# Patient Record
Sex: MALE | Race: WHITE | URBAN - METROPOLITAN AREA
[De-identification: names, ages, dates, MRNs, and addresses within clinical notes are randomized per-mention and may not be internally consistent; named-entity substitution may affect disease eponyms.]

---

## 2022-10-17 ENCOUNTER — OFFICE VISIT (OUTPATIENT)
Dept: URBAN - METROPOLITAN AREA CLINIC 71 | Facility: CLINIC | Age: 32
End: 2022-10-17
Payer: COMMERCIAL

## 2022-10-17 DIAGNOSIS — H00.029 HORDEOLUM INTERNUM OF EYELID: Primary | ICD-10-CM

## 2022-10-17 PROCEDURE — 92002 INTRM OPH EXAM NEW PATIENT: CPT | Performed by: OPTOMETRIST

## 2022-10-17 RX ORDER — NEOMYCIN SULFATE, POLYMYXIN B SULFATE AND DEXAMETHASONE 3.5; 10000; 1 MG/ML; [USP'U]/ML; MG/ML
SUSPENSION OPHTHALMIC
Qty: 5 | Refills: 0 | Status: INACTIVE
Start: 2022-10-17 | End: 2022-10-23

## 2022-10-17 ASSESSMENT — INTRAOCULAR PRESSURE
OD: 18
OS: 14

## 2022-10-17 NOTE — IMPRESSION/PLAN
Impression: Hordeolum internum of eyelid: H00.029. BLL, OS>OD. Pt started on Ocuflox at [facility] about 5 days ago. Plan: Discussed condition. Was able to express some discharge from RLL in office today with sterile cotton swab. Recommend alternating heat (i.e. water bottle) and pressure for 5 min QID until gone. Warm compress handout given to pt today. Discussed good lid hygiene with mild soap daily. Continue taking Omega-3 Fish Oils, recommend daily. Okay to stop Ocuflox. Start Maxitrol QID OU x 1 week then stop. Glaucoma precautions reviewed. Rx sent to pharmacy. Pt to call with concerns.